# Patient Record
Sex: MALE | Race: WHITE | NOT HISPANIC OR LATINO | Employment: FULL TIME | URBAN - METROPOLITAN AREA
[De-identification: names, ages, dates, MRNs, and addresses within clinical notes are randomized per-mention and may not be internally consistent; named-entity substitution may affect disease eponyms.]

---

## 2017-07-28 LAB
CHOLEST SERPL-MCNC: 235 MG/DL (ref 100–199)
HDLC SERPL-MCNC: 75 MG/DL
LDLC SERPL CALC-MCNC: 144 MG/DL (ref 0–99)
TRIGL SERPL-MCNC: 81 MG/DL (ref 0–149)
VLDLC SERPL CALC-MCNC: 16 MG/DL (ref 5–40)

## 2018-05-23 LAB — RAPID GROUP A STREP (OHS): NEGATIVE

## 2020-08-18 ENCOUNTER — HISTORICAL (OUTPATIENT)
Dept: LAB | Facility: HOSPITAL | Age: 45
End: 2020-08-18

## 2021-04-13 ENCOUNTER — HISTORICAL (OUTPATIENT)
Dept: LAB | Facility: HOSPITAL | Age: 46
End: 2021-04-13

## 2021-08-04 ENCOUNTER — HISTORICAL (OUTPATIENT)
Dept: LAB | Facility: HOSPITAL | Age: 46
End: 2021-08-04

## 2021-10-25 ENCOUNTER — HISTORICAL (OUTPATIENT)
Dept: LAB | Facility: HOSPITAL | Age: 46
End: 2021-10-25

## 2021-11-30 ENCOUNTER — HISTORICAL (OUTPATIENT)
Dept: LAB | Facility: HOSPITAL | Age: 46
End: 2021-11-30

## 2022-04-07 ENCOUNTER — HISTORICAL (OUTPATIENT)
Dept: ADMINISTRATIVE | Facility: HOSPITAL | Age: 47
End: 2022-04-07

## 2022-04-24 VITALS
DIASTOLIC BLOOD PRESSURE: 80 MMHG | OXYGEN SATURATION: 99 % | SYSTOLIC BLOOD PRESSURE: 137 MMHG | WEIGHT: 224.63 LBS | BODY MASS INDEX: 28.83 KG/M2 | HEIGHT: 74 IN

## 2022-06-30 ENCOUNTER — OFFICE VISIT (OUTPATIENT)
Dept: FAMILY MEDICINE | Facility: CLINIC | Age: 47
End: 2022-06-30
Payer: COMMERCIAL

## 2022-06-30 VITALS
BODY MASS INDEX: 27.59 KG/M2 | DIASTOLIC BLOOD PRESSURE: 86 MMHG | TEMPERATURE: 100 F | OXYGEN SATURATION: 97 % | HEIGHT: 74 IN | WEIGHT: 215 LBS | RESPIRATION RATE: 16 BRPM | HEART RATE: 73 BPM | SYSTOLIC BLOOD PRESSURE: 120 MMHG

## 2022-06-30 DIAGNOSIS — J06.9 UPPER RESPIRATORY TRACT INFECTION, UNSPECIFIED TYPE: Primary | ICD-10-CM

## 2022-06-30 DIAGNOSIS — J02.9 SORE THROAT: ICD-10-CM

## 2022-06-30 DIAGNOSIS — R09.82 POST-NASAL DRIP: ICD-10-CM

## 2022-06-30 PROBLEM — E55.9 VITAMIN D DEFICIENCY: Status: ACTIVE | Noted: 2022-06-30

## 2022-06-30 PROBLEM — E53.8 COBALAMIN DEFICIENCY: Status: ACTIVE | Noted: 2022-06-30

## 2022-06-30 PROBLEM — F41.1 GENERALIZED ANXIETY DISORDER: Status: ACTIVE | Noted: 2022-06-30

## 2022-06-30 PROBLEM — E78.5 HYPERLIPIDEMIA: Status: ACTIVE | Noted: 2022-06-30

## 2022-06-30 LAB
CTP QC/QA: YES
CTP QC/QA: YES
FLUAV AG NPH QL: NEGATIVE
FLUBV AG NPH QL: NEGATIVE
S PYO RRNA THROAT QL PROBE: NEGATIVE

## 2022-06-30 PROCEDURE — 1160F RVW MEDS BY RX/DR IN RCRD: CPT | Mod: CPTII,,, | Performed by: NURSE PRACTITIONER

## 2022-06-30 PROCEDURE — 87804 POCT INFLUENZA A/B: ICD-10-PCS | Mod: QW,,, | Performed by: NURSE PRACTITIONER

## 2022-06-30 PROCEDURE — 96372 THER/PROPH/DIAG INJ SC/IM: CPT | Mod: ,,, | Performed by: NURSE PRACTITIONER

## 2022-06-30 PROCEDURE — 87880 POCT RAPID STREP A: ICD-10-PCS | Mod: QW,,, | Performed by: NURSE PRACTITIONER

## 2022-06-30 PROCEDURE — 87880 STREP A ASSAY W/OPTIC: CPT | Mod: QW,,, | Performed by: NURSE PRACTITIONER

## 2022-06-30 PROCEDURE — 3079F PR MOST RECENT DIASTOLIC BLOOD PRESSURE 80-89 MM HG: ICD-10-PCS | Mod: CPTII,,, | Performed by: NURSE PRACTITIONER

## 2022-06-30 PROCEDURE — 1160F PR REVIEW ALL MEDS BY PRESCRIBER/CLIN PHARMACIST DOCUMENTED: ICD-10-PCS | Mod: CPTII,,, | Performed by: NURSE PRACTITIONER

## 2022-06-30 PROCEDURE — 99213 PR OFFICE/OUTPT VISIT, EST, LEVL III, 20-29 MIN: ICD-10-PCS | Mod: 25,,, | Performed by: NURSE PRACTITIONER

## 2022-06-30 PROCEDURE — 96372 PR INJECTION,THERAP/PROPH/DIAG2ST, IM OR SUBCUT: ICD-10-PCS | Mod: ,,, | Performed by: NURSE PRACTITIONER

## 2022-06-30 PROCEDURE — 99213 OFFICE O/P EST LOW 20 MIN: CPT | Mod: 25,,, | Performed by: NURSE PRACTITIONER

## 2022-06-30 PROCEDURE — 3079F DIAST BP 80-89 MM HG: CPT | Mod: CPTII,,, | Performed by: NURSE PRACTITIONER

## 2022-06-30 PROCEDURE — 3008F PR BODY MASS INDEX (BMI) DOCUMENTED: ICD-10-PCS | Mod: CPTII,,, | Performed by: NURSE PRACTITIONER

## 2022-06-30 PROCEDURE — 87804 INFLUENZA ASSAY W/OPTIC: CPT | Mod: QW,,, | Performed by: NURSE PRACTITIONER

## 2022-06-30 PROCEDURE — 3074F SYST BP LT 130 MM HG: CPT | Mod: CPTII,,, | Performed by: NURSE PRACTITIONER

## 2022-06-30 PROCEDURE — 1159F MED LIST DOCD IN RCRD: CPT | Mod: CPTII,,, | Performed by: NURSE PRACTITIONER

## 2022-06-30 PROCEDURE — 1159F PR MEDICATION LIST DOCUMENTED IN MEDICAL RECORD: ICD-10-PCS | Mod: CPTII,,, | Performed by: NURSE PRACTITIONER

## 2022-06-30 PROCEDURE — 3008F BODY MASS INDEX DOCD: CPT | Mod: CPTII,,, | Performed by: NURSE PRACTITIONER

## 2022-06-30 PROCEDURE — 3074F PR MOST RECENT SYSTOLIC BLOOD PRESSURE < 130 MM HG: ICD-10-PCS | Mod: CPTII,,, | Performed by: NURSE PRACTITIONER

## 2022-06-30 RX ORDER — DEXAMETHASONE SODIUM PHOSPHATE 100 MG/10ML
10 INJECTION INTRAMUSCULAR; INTRAVENOUS
Status: COMPLETED | OUTPATIENT
Start: 2022-06-30 | End: 2022-06-30

## 2022-06-30 RX ORDER — ALPRAZOLAM 0.25 MG/1
0.25 TABLET ORAL 3 TIMES DAILY PRN
COMMUNITY
Start: 2022-02-02 | End: 2023-11-02 | Stop reason: SDUPTHER

## 2022-06-30 RX ORDER — ROSUVASTATIN CALCIUM 20 MG/1
20 TABLET, COATED ORAL NIGHTLY
COMMUNITY
Start: 2022-04-13 | End: 2022-10-11

## 2022-06-30 RX ADMIN — DEXAMETHASONE SODIUM PHOSPHATE 10 MG: 100 INJECTION INTRAMUSCULAR; INTRAVENOUS at 10:06

## 2022-06-30 NOTE — PROGRESS NOTES
Subjective:       Patient ID: Iron Yusuf Jr. is a 46 y.o. male.    Chief Complaint: Sore Throat (X2 days), post nasal drip (X2 days), and Fatigue      HPI     This is a 46-year-old white male who presents to clinic today with complaint sore throat, postnasal drip, fatigue, nasal congestion that started 2 days ago.  Denies any sick contacts.  Denies any fever.      Review of Systems   Constitutional: Negative.    HENT: Positive for nasal congestion, postnasal drip and sore throat.    Eyes: Negative.    Respiratory: Negative.    Cardiovascular: Negative.    Gastrointestinal: Negative.    Musculoskeletal: Negative.    Integumentary:  Negative.   Allergic/Immunologic: Negative.    Neurological: Negative.    Hematological: Negative.    Psychiatric/Behavioral: Negative.    All other systems reviewed and are negative.          The patient's Health Maintenance was reviewed and the following appears to be due:   Health Maintenance Due   Topic Date Due    Hepatitis C Screening  Never done    COVID-19 Vaccine (1) Never done    HIV Screening  Never done    TETANUS VACCINE  Never done    Colorectal Cancer Screening  Never done       Past Medical History:  Past Medical History:   Diagnosis Date    Anxiety disorder, unspecified     Cobalamin deficiency     Hyperlipidemia     Indigestion     Vitamin D deficiency      History reviewed. No pertinent surgical history.  Review of patient's allergies indicates:  No Known Allergies  Current Outpatient Medications on File Prior to Visit   Medication Sig Dispense Refill    ALPRAZolam (XANAX) 0.25 MG tablet Take 0.25 mg by mouth 3 (three) times daily as needed.      rosuvastatin (CRESTOR) 20 MG tablet Take 20 mg by mouth nightly.       No current facility-administered medications on file prior to visit.     Social History     Socioeconomic History    Marital status:    Tobacco Use    Smoking status: Never Smoker    Smokeless tobacco: Never Used   Substance and Sexual  "Activity    Alcohol use: Yes    Drug use: Never     Family History   Problem Relation Age of Onset    Rheum arthritis Mother          Objective:       /86 (BP Location: Left arm)   Pulse 73   Temp 99.8 °F (37.7 °C) (Oral)   Resp 16   Ht 6' 2" (1.88 m)   Wt 97.5 kg (215 lb)   SpO2 97%   BMI 27.60 kg/m²      Physical Exam  Vitals and nursing note reviewed.   Constitutional:       Appearance: Normal appearance. He is normal weight.   HENT:      Head: Normocephalic and atraumatic.      Right Ear: Tympanic membrane, ear canal and external ear normal.      Left Ear: Tympanic membrane, ear canal and external ear normal.      Nose: Rhinorrhea present.      Mouth/Throat:      Mouth: Mucous membranes are moist.      Pharynx: Oropharynx is clear. Posterior oropharyngeal erythema present.   Eyes:      Extraocular Movements: Extraocular movements intact.      Conjunctiva/sclera: Conjunctivae normal.      Pupils: Pupils are equal, round, and reactive to light.   Cardiovascular:      Rate and Rhythm: Normal rate and regular rhythm.      Pulses: Normal pulses.      Heart sounds: Normal heart sounds.   Pulmonary:      Effort: Pulmonary effort is normal.      Breath sounds: Normal breath sounds.   Musculoskeletal:         General: Normal range of motion.      Cervical back: Normal range of motion and neck supple.   Skin:     General: Skin is warm and dry.   Neurological:      General: No focal deficit present.      Mental Status: He is alert and oriented to person, place, and time.   Psychiatric:         Mood and Affect: Mood normal.         Behavior: Behavior normal.         Thought Content: Thought content normal.         Judgment: Judgment normal.         Labs  Office Visit on 06/30/2022   Component Date Value Ref Range Status    Rapid Strep A Screen 06/30/2022 Negative  Negative Final-Edited     Acceptable 06/30/2022 Yes   Final-Edited             Assessment:       Problem List Items Addressed " This Visit    None     Visit Diagnoses     Upper respiratory tract infection, unspecified type    -  Primary    Relevant Medications    dexamethasone injection 10 mg (Start on 6/30/2022 10:00 AM)    Sore throat        Relevant Orders    POCT Rapid Strep A (Completed)    POCT Influenza A/B    Post-nasal drip              Plan:         1. Upper respiratory tract infection, unspecified type  Instructed to take home cover testing quarantine if positive.  Continue over-the-counter [place] for symptoms.  - dexamethasone injection 10 mg    2. Sore throat  Strep negative  - POCT Rapid Strep A  - POCT Influenza A/B    3. Post-nasal drip  Flu negative

## 2022-09-12 ENCOUNTER — OFFICE VISIT (OUTPATIENT)
Dept: FAMILY MEDICINE | Facility: CLINIC | Age: 47
End: 2022-09-12
Payer: COMMERCIAL

## 2022-09-12 VITALS
RESPIRATION RATE: 16 BRPM | DIASTOLIC BLOOD PRESSURE: 82 MMHG | SYSTOLIC BLOOD PRESSURE: 126 MMHG | WEIGHT: 218 LBS | TEMPERATURE: 99 F | HEIGHT: 74 IN | OXYGEN SATURATION: 99 % | BODY MASS INDEX: 27.98 KG/M2 | HEART RATE: 50 BPM

## 2022-09-12 DIAGNOSIS — T78.40XA ALLERGIC REACTION, INITIAL ENCOUNTER: ICD-10-CM

## 2022-09-12 DIAGNOSIS — T63.461A WASP STING, ACCIDENTAL OR UNINTENTIONAL, INITIAL ENCOUNTER: Primary | ICD-10-CM

## 2022-09-12 PROCEDURE — 99212 PR OFFICE/OUTPT VISIT, EST, LEVL II, 10-19 MIN: ICD-10-PCS | Mod: 25,,, | Performed by: NURSE PRACTITIONER

## 2022-09-12 PROCEDURE — 96372 THER/PROPH/DIAG INJ SC/IM: CPT | Mod: ,,, | Performed by: NURSE PRACTITIONER

## 2022-09-12 PROCEDURE — 1160F RVW MEDS BY RX/DR IN RCRD: CPT | Mod: CPTII,,, | Performed by: NURSE PRACTITIONER

## 2022-09-12 PROCEDURE — 1159F MED LIST DOCD IN RCRD: CPT | Mod: CPTII,,, | Performed by: NURSE PRACTITIONER

## 2022-09-12 PROCEDURE — 1160F PR REVIEW ALL MEDS BY PRESCRIBER/CLIN PHARMACIST DOCUMENTED: ICD-10-PCS | Mod: CPTII,,, | Performed by: NURSE PRACTITIONER

## 2022-09-12 PROCEDURE — 1159F PR MEDICATION LIST DOCUMENTED IN MEDICAL RECORD: ICD-10-PCS | Mod: CPTII,,, | Performed by: NURSE PRACTITIONER

## 2022-09-12 PROCEDURE — 3074F SYST BP LT 130 MM HG: CPT | Mod: CPTII,,, | Performed by: NURSE PRACTITIONER

## 2022-09-12 PROCEDURE — 3079F PR MOST RECENT DIASTOLIC BLOOD PRESSURE 80-89 MM HG: ICD-10-PCS | Mod: CPTII,,, | Performed by: NURSE PRACTITIONER

## 2022-09-12 PROCEDURE — 99212 OFFICE O/P EST SF 10 MIN: CPT | Mod: 25,,, | Performed by: NURSE PRACTITIONER

## 2022-09-12 PROCEDURE — 96372 PR INJECTION,THERAP/PROPH/DIAG2ST, IM OR SUBCUT: ICD-10-PCS | Mod: ,,, | Performed by: NURSE PRACTITIONER

## 2022-09-12 PROCEDURE — 3008F BODY MASS INDEX DOCD: CPT | Mod: CPTII,,, | Performed by: NURSE PRACTITIONER

## 2022-09-12 PROCEDURE — 3074F PR MOST RECENT SYSTOLIC BLOOD PRESSURE < 130 MM HG: ICD-10-PCS | Mod: CPTII,,, | Performed by: NURSE PRACTITIONER

## 2022-09-12 PROCEDURE — 3079F DIAST BP 80-89 MM HG: CPT | Mod: CPTII,,, | Performed by: NURSE PRACTITIONER

## 2022-09-12 PROCEDURE — 3008F PR BODY MASS INDEX (BMI) DOCUMENTED: ICD-10-PCS | Mod: CPTII,,, | Performed by: NURSE PRACTITIONER

## 2022-09-12 RX ORDER — DEXAMETHASONE SODIUM PHOSPHATE 100 MG/10ML
10 INJECTION INTRAMUSCULAR; INTRAVENOUS
Status: COMPLETED | OUTPATIENT
Start: 2022-09-12 | End: 2022-09-12

## 2022-09-12 RX ADMIN — DEXAMETHASONE SODIUM PHOSPHATE 10 MG: 100 INJECTION INTRAMUSCULAR; INTRAVENOUS at 08:09

## 2022-09-12 NOTE — PROGRESS NOTES
Subjective:       Patient ID: Iron Yusuf Jr. is a 46 y.o. male.    Chief Complaint: Insect Bite (Wasp sting to L wrist. Swelling spreading up arm. Redness to area. Warm to touch.)      HPI   This is a 46-year-old white male who presents to clinic today with complaint of left wrist swelling, redness, itching.  States was outside working yesterday and got stung by wasp at around 11:00 a.m..  States throughout the afternoon the swelling and redness got worse.  Denies any shortness of breath, scratchy throat.  Complains of redness, swelling, itching, mild pain and warmth to the site and spreading up his arm.    Review of Systems  Comprehensive review of systems negative except as stated in HPI    The patient's Health Maintenance was reviewed and the following appears to be due:   Health Maintenance Due   Topic Date Due    Hepatitis C Screening  Never done    COVID-19 Vaccine (1) Never done    HIV Screening  Never done    TETANUS VACCINE  Never done    Colorectal Cancer Screening  Never done    Influenza Vaccine (1) Never done       Past Medical History:  Past Medical History:   Diagnosis Date    Anxiety disorder, unspecified     Cobalamin deficiency     Hyperlipidemia     Indigestion     Vitamin D deficiency      History reviewed. No pertinent surgical history.  Review of patient's allergies indicates:  No Known Allergies  Current Outpatient Medications on File Prior to Visit   Medication Sig Dispense Refill    ALPRAZolam (XANAX) 0.25 MG tablet Take 0.25 mg by mouth 3 (three) times daily as needed.      rosuvastatin (CRESTOR) 20 MG tablet Take 20 mg by mouth nightly.       No current facility-administered medications on file prior to visit.     Social History     Socioeconomic History    Marital status:    Tobacco Use    Smoking status: Never    Smokeless tobacco: Never   Substance and Sexual Activity    Alcohol use: Yes    Drug use: Never     Family History   Problem Relation Age of Onset    Rheum arthritis  "Mother        Objective:       /82 (BP Location: Left arm)   Pulse (!) 50   Temp 98.5 °F (36.9 °C) (Oral)   Resp 16   Ht 6' 2" (1.88 m)   Wt 98.9 kg (218 lb)   SpO2 99%   BMI 27.99 kg/m²      Physical Exam  Vitals and nursing note reviewed.   Constitutional:       Appearance: Normal appearance. He is normal weight.   HENT:      Head: Normocephalic and atraumatic.      Right Ear: Tympanic membrane, ear canal and external ear normal.      Left Ear: Tympanic membrane, ear canal and external ear normal.      Nose: Nose normal.      Mouth/Throat:      Mouth: Mucous membranes are moist.      Pharynx: Oropharynx is clear.   Eyes:      Extraocular Movements: Extraocular movements intact.      Conjunctiva/sclera: Conjunctivae normal.      Pupils: Pupils are equal, round, and reactive to light.   Cardiovascular:      Rate and Rhythm: Regular rhythm. Bradycardia present.      Pulses: Normal pulses.      Heart sounds: Normal heart sounds.   Pulmonary:      Effort: Pulmonary effort is normal.      Breath sounds: Normal breath sounds.   Musculoskeletal:         General: Normal range of motion.      Cervical back: Normal range of motion and neck supple.   Skin:     General: Skin is warm and dry.      Findings: Erythema present.      Comments: Left lateral wrist with wasp sting. Surrounding erythema and swelling extending up back of forearm midway to elbow.    Neurological:      General: No focal deficit present.      Mental Status: He is alert and oriented to person, place, and time.   Psychiatric:         Mood and Affect: Mood normal.         Behavior: Behavior normal.         Thought Content: Thought content normal.         Judgment: Judgment normal.       Labs  Office Visit on 06/30/2022   Component Date Value Ref Range Status    Rapid Strep A Screen 06/30/2022 Negative  Negative Final-Edited     Acceptable 06/30/2022 Yes   Final-Edited    Rapid Influenza A Ag 06/30/2022 Negative  Negative Final    " Rapid Influenza B Ag 06/30/2022 Negative  Negative Final     Acceptable 06/30/2022 Yes   Final       Assessment and Plan     1. Wasp sting, accidental or unintentional, initial encounter  -     dexamethasone injection 10 mg    2. Allergic reaction, initial encounter  -     dexamethasone injection 10 mg       Instructed to tale OTC Benadryl as needed and RTC if worsens.     Follow up if symptoms worsen or fail to improve.

## 2022-09-15 ENCOUNTER — HISTORICAL (OUTPATIENT)
Dept: ADMINISTRATIVE | Facility: HOSPITAL | Age: 47
End: 2022-09-15
Payer: COMMERCIAL

## 2022-10-25 ENCOUNTER — TELEPHONE (OUTPATIENT)
Dept: FAMILY MEDICINE | Facility: CLINIC | Age: 47
End: 2022-10-25
Payer: COMMERCIAL

## 2022-10-25 DIAGNOSIS — Z11.59 NEED FOR HEPATITIS C SCREENING TEST: ICD-10-CM

## 2022-10-25 DIAGNOSIS — Z00.00 ANNUAL PHYSICAL EXAM: Primary | ICD-10-CM

## 2022-10-25 DIAGNOSIS — Z12.5 PROSTATE CANCER SCREENING: ICD-10-CM

## 2022-10-25 DIAGNOSIS — E55.9 VITAMIN D DEFICIENCY: ICD-10-CM

## 2022-10-25 DIAGNOSIS — Z11.4 SCREENING FOR HIV (HUMAN IMMUNODEFICIENCY VIRUS): ICD-10-CM

## 2022-10-25 DIAGNOSIS — E53.8 COBALAMIN DEFICIENCY: ICD-10-CM

## 2022-10-25 NOTE — TELEPHONE ENCOUNTER
Are there any outstanding tasks in patient's chart?  labs    2. Do we have outstanding/pending referrals?  n    3. Has the patient been seen in an ER, Urgent Care, or admitted since last visit?  n    4. Has patient seen any other health care providers since last visit?  n    5.  Has patient had any blood work or x-rays done since last visit?     Patient will complete labs at [facility]      Please order labs wellness labs at [facility]

## 2022-10-28 ENCOUNTER — TELEPHONE (OUTPATIENT)
Dept: FAMILY MEDICINE | Facility: CLINIC | Age: 47
End: 2022-10-28
Payer: COMMERCIAL

## 2022-10-28 ENCOUNTER — LAB VISIT (OUTPATIENT)
Dept: LAB | Facility: HOSPITAL | Age: 47
End: 2022-10-28
Attending: NURSE PRACTITIONER
Payer: COMMERCIAL

## 2022-10-28 DIAGNOSIS — Z11.59 NEED FOR HEPATITIS C SCREENING TEST: ICD-10-CM

## 2022-10-28 DIAGNOSIS — Z11.4 SCREENING FOR HIV (HUMAN IMMUNODEFICIENCY VIRUS): ICD-10-CM

## 2022-10-28 DIAGNOSIS — R73.9 HYPERGLYCEMIA: Primary | ICD-10-CM

## 2022-10-28 DIAGNOSIS — E53.8 COBALAMIN DEFICIENCY: ICD-10-CM

## 2022-10-28 DIAGNOSIS — Z12.5 PROSTATE CANCER SCREENING: ICD-10-CM

## 2022-10-28 DIAGNOSIS — E55.9 VITAMIN D DEFICIENCY: ICD-10-CM

## 2022-10-28 DIAGNOSIS — Z00.00 ANNUAL PHYSICAL EXAM: ICD-10-CM

## 2022-10-28 LAB
ALBUMIN SERPL-MCNC: 4.2 GM/DL (ref 3.5–5)
ALBUMIN/GLOB SERPL: 1.2 RATIO (ref 1.1–2)
ALP SERPL-CCNC: 79 UNIT/L (ref 40–150)
ALT SERPL-CCNC: 68 UNIT/L (ref 0–55)
AST SERPL-CCNC: 48 UNIT/L (ref 5–34)
BASOPHILS # BLD AUTO: 0.03 X10(3)/MCL (ref 0–0.2)
BASOPHILS NFR BLD AUTO: 0.6 %
BILIRUBIN DIRECT+TOT PNL SERPL-MCNC: 0.5 MG/DL
BUN SERPL-MCNC: 15.3 MG/DL (ref 8.9–20.6)
CALCIUM SERPL-MCNC: 9.5 MG/DL (ref 8.4–10.2)
CHLORIDE SERPL-SCNC: 105 MMOL/L (ref 98–107)
CHOLEST SERPL-MCNC: 186 MG/DL
CHOLEST/HDLC SERPL: 2 {RATIO} (ref 0–5)
CO2 SERPL-SCNC: 26 MMOL/L (ref 22–29)
CREAT SERPL-MCNC: 1.1 MG/DL (ref 0.73–1.18)
DEPRECATED CALCIDIOL+CALCIFEROL SERPL-MC: 53.4 NG/ML (ref 30–80)
EOSINOPHIL # BLD AUTO: 0.14 X10(3)/MCL (ref 0–0.9)
EOSINOPHIL NFR BLD AUTO: 2.8 %
ERYTHROCYTE [DISTWIDTH] IN BLOOD BY AUTOMATED COUNT: 12.9 % (ref 11.5–17)
EST. AVERAGE GLUCOSE BLD GHB EST-MCNC: 111.2 MG/DL
GFR SERPLBLD CREATININE-BSD FMLA CKD-EPI: >60 MLS/MIN/1.73/M2
GLOBULIN SER-MCNC: 3.5 GM/DL (ref 2.4–3.5)
GLUCOSE SERPL-MCNC: 107 MG/DL (ref 74–100)
HBA1C MFR BLD: 5.5 %
HCT VFR BLD AUTO: 47.7 % (ref 42–52)
HCV AB SERPL QL IA: NONREACTIVE
HDLC SERPL-MCNC: 78 MG/DL (ref 35–60)
HGB BLD-MCNC: 15.6 GM/DL (ref 14–18)
HIV 1+2 AB+HIV1 P24 AG SERPL QL IA: NONREACTIVE
IMM GRANULOCYTES # BLD AUTO: 0.02 X10(3)/MCL (ref 0–0.04)
IMM GRANULOCYTES NFR BLD AUTO: 0.4 %
LDLC SERPL CALC-MCNC: 88 MG/DL (ref 50–140)
LYMPHOCYTES # BLD AUTO: 1.88 X10(3)/MCL (ref 0.6–4.6)
LYMPHOCYTES NFR BLD AUTO: 38.1 %
MCH RBC QN AUTO: 30.2 PG (ref 27–31)
MCHC RBC AUTO-ENTMCNC: 32.7 MG/DL (ref 33–36)
MCV RBC AUTO: 92.4 FL (ref 80–94)
MONOCYTES # BLD AUTO: 0.58 X10(3)/MCL (ref 0.1–1.3)
MONOCYTES NFR BLD AUTO: 11.8 %
NEUTROPHILS # BLD AUTO: 2.3 X10(3)/MCL (ref 2.1–9.2)
NEUTROPHILS NFR BLD AUTO: 46.3 %
PLATELET # BLD AUTO: 248 X10(3)/MCL (ref 130–400)
PMV BLD AUTO: 9.1 FL (ref 7.4–10.4)
POTASSIUM SERPL-SCNC: 4.5 MMOL/L (ref 3.5–5.1)
PROT SERPL-MCNC: 7.7 GM/DL (ref 6.4–8.3)
PSA SERPL-MCNC: 0.7 NG/ML
RBC # BLD AUTO: 5.16 X10(6)/MCL (ref 4.7–6.1)
SODIUM SERPL-SCNC: 143 MMOL/L (ref 136–145)
TRIGL SERPL-MCNC: 100 MG/DL (ref 34–140)
TSH SERPL-ACNC: 1.43 UIU/ML (ref 0.35–4.94)
VIT B12 SERPL-MCNC: 644 PG/ML (ref 213–816)
VLDLC SERPL CALC-MCNC: 20 MG/DL
WBC # SPEC AUTO: 4.9 X10(3)/MCL (ref 4.5–11.5)

## 2022-10-28 PROCEDURE — 82607 VITAMIN B-12: CPT

## 2022-10-28 PROCEDURE — 87389 HIV-1 AG W/HIV-1&-2 AB AG IA: CPT

## 2022-10-28 PROCEDURE — 36415 COLL VENOUS BLD VENIPUNCTURE: CPT

## 2022-10-28 PROCEDURE — 86803 HEPATITIS C AB TEST: CPT

## 2022-10-28 PROCEDURE — 85025 COMPLETE CBC W/AUTO DIFF WBC: CPT

## 2022-10-28 PROCEDURE — 80053 COMPREHEN METABOLIC PANEL: CPT

## 2022-10-28 PROCEDURE — 80061 LIPID PANEL: CPT

## 2022-10-28 PROCEDURE — 84153 ASSAY OF PSA TOTAL: CPT

## 2022-10-28 PROCEDURE — 82306 VITAMIN D 25 HYDROXY: CPT

## 2022-10-28 PROCEDURE — 83036 HEMOGLOBIN GLYCOSYLATED A1C: CPT

## 2022-10-28 PROCEDURE — 84443 ASSAY THYROID STIM HORMONE: CPT

## 2022-10-28 NOTE — TELEPHONE ENCOUNTER
----- Message from JUANJO Smith sent at 10/28/2022  8:25 AM CDT -----  Glucose elevated at 107 nonfasting labs, hemoglobin A1c added.  Patient nubia this morning, please notify [facility] of add on lab.

## 2022-10-28 NOTE — PROGRESS NOTES
Glucose elevated at 107 nonfasting labs, hemoglobin A1c added.  Patient nubia this morning, please notify [facility] of add on lab.

## 2022-11-01 ENCOUNTER — OFFICE VISIT (OUTPATIENT)
Dept: FAMILY MEDICINE | Facility: CLINIC | Age: 47
End: 2022-11-01
Payer: COMMERCIAL

## 2022-11-01 VITALS
SYSTOLIC BLOOD PRESSURE: 128 MMHG | TEMPERATURE: 98 F | HEIGHT: 74 IN | DIASTOLIC BLOOD PRESSURE: 74 MMHG | WEIGHT: 216 LBS | RESPIRATION RATE: 16 BRPM | HEART RATE: 56 BPM | BODY MASS INDEX: 27.72 KG/M2 | OXYGEN SATURATION: 98 %

## 2022-11-01 DIAGNOSIS — G47.33 OSA (OBSTRUCTIVE SLEEP APNEA): ICD-10-CM

## 2022-11-01 DIAGNOSIS — E78.5 HYPERLIPIDEMIA, UNSPECIFIED HYPERLIPIDEMIA TYPE: ICD-10-CM

## 2022-11-01 DIAGNOSIS — Z12.5 PROSTATE CANCER SCREENING: ICD-10-CM

## 2022-11-01 DIAGNOSIS — E55.9 VITAMIN D DEFICIENCY: ICD-10-CM

## 2022-11-01 DIAGNOSIS — Z12.11 COLON CANCER SCREENING: ICD-10-CM

## 2022-11-01 DIAGNOSIS — Z11.59 NEED FOR HEPATITIS C SCREENING TEST: ICD-10-CM

## 2022-11-01 DIAGNOSIS — E53.8 COBALAMIN DEFICIENCY: ICD-10-CM

## 2022-11-01 DIAGNOSIS — Z00.00 ANNUAL PHYSICAL EXAM: Primary | ICD-10-CM

## 2022-11-01 DIAGNOSIS — Z11.4 SCREENING FOR HIV (HUMAN IMMUNODEFICIENCY VIRUS): ICD-10-CM

## 2022-11-01 PROCEDURE — 1160F RVW MEDS BY RX/DR IN RCRD: CPT | Mod: CPTII,,, | Performed by: NURSE PRACTITIONER

## 2022-11-01 PROCEDURE — 3074F PR MOST RECENT SYSTOLIC BLOOD PRESSURE < 130 MM HG: ICD-10-PCS | Mod: CPTII,,, | Performed by: NURSE PRACTITIONER

## 2022-11-01 PROCEDURE — 1159F MED LIST DOCD IN RCRD: CPT | Mod: CPTII,,, | Performed by: NURSE PRACTITIONER

## 2022-11-01 PROCEDURE — 3008F BODY MASS INDEX DOCD: CPT | Mod: CPTII,,, | Performed by: NURSE PRACTITIONER

## 2022-11-01 PROCEDURE — 3078F DIAST BP <80 MM HG: CPT | Mod: CPTII,,, | Performed by: NURSE PRACTITIONER

## 2022-11-01 PROCEDURE — 1159F PR MEDICATION LIST DOCUMENTED IN MEDICAL RECORD: ICD-10-PCS | Mod: CPTII,,, | Performed by: NURSE PRACTITIONER

## 2022-11-01 PROCEDURE — 99396 PREV VISIT EST AGE 40-64: CPT | Mod: ,,, | Performed by: NURSE PRACTITIONER

## 2022-11-01 PROCEDURE — 1160F PR REVIEW ALL MEDS BY PRESCRIBER/CLIN PHARMACIST DOCUMENTED: ICD-10-PCS | Mod: CPTII,,, | Performed by: NURSE PRACTITIONER

## 2022-11-01 PROCEDURE — 3078F PR MOST RECENT DIASTOLIC BLOOD PRESSURE < 80 MM HG: ICD-10-PCS | Mod: CPTII,,, | Performed by: NURSE PRACTITIONER

## 2022-11-01 PROCEDURE — 3008F PR BODY MASS INDEX (BMI) DOCUMENTED: ICD-10-PCS | Mod: CPTII,,, | Performed by: NURSE PRACTITIONER

## 2022-11-01 PROCEDURE — 99396 PR PREVENTIVE VISIT,EST,40-64: ICD-10-PCS | Mod: ,,, | Performed by: NURSE PRACTITIONER

## 2022-11-01 PROCEDURE — 3074F SYST BP LT 130 MM HG: CPT | Mod: CPTII,,, | Performed by: NURSE PRACTITIONER

## 2022-11-01 RX ORDER — AMPICILLIN TRIHYDRATE 250 MG
CAPSULE ORAL
COMMUNITY

## 2022-11-01 RX ORDER — MULTIVITAMIN
1 TABLET ORAL DAILY
COMMUNITY

## 2022-11-01 NOTE — ASSESSMENT & PLAN NOTE
Discussed colon cancer screening options with patient including colonoscopy and Cologuard.  Patient would like to think about it and speak with his wife and he will call me and let me know what his decision is.

## 2022-11-01 NOTE — PROGRESS NOTES
Subjective:       Patient ID: Iron Yusuf Jr. is a 46 y.o. male.    Chief Complaint: Annual Exam      HPI   This is a 46-year-old white male who presents to clinic today for an annual wellness exam.  Patient has past medical history of vitamin-D deficiency, B12 deficiency, hyperlipidemia, anxiety, sleep apnea.  States doing well with his medication and denies any side effects.  No complaints today.    Review of Systems  Comprehensive review of systems negative except as stated in HPI    The patient's Health Maintenance was reviewed and the following appears to be due:   Health Maintenance Due   Topic Date Due    TETANUS VACCINE  Never done    Colorectal Cancer Screening  Never done       Past Medical History:  Past Medical History:   Diagnosis Date    Anxiety disorder, unspecified     Cobalamin deficiency     Hyperlipidemia     Indigestion     Vitamin D deficiency      History reviewed. No pertinent surgical history.  Review of patient's allergies indicates:  No Known Allergies  Current Outpatient Medications on File Prior to Visit   Medication Sig Dispense Refill    ALPRAZolam (XANAX) 0.25 MG tablet Take 0.25 mg by mouth 3 (three) times daily as needed.      multivitamin (THERAGRAN) per tablet Take 1 tablet by mouth once daily.      red yeast rice 600 mg Cap Take by mouth.      rosuvastatin (CRESTOR) 20 MG tablet TAKE 1 TABLET BY MOUTH EVERYDAY AT BEDTIME 90 tablet 1     No current facility-administered medications on file prior to visit.     Social History     Socioeconomic History    Marital status:    Tobacco Use    Smoking status: Never     Passive exposure: Never    Smokeless tobacco: Never   Substance and Sexual Activity    Alcohol use: Yes     Comment: socially    Drug use: Never    Sexual activity: Yes     Family History   Problem Relation Age of Onset    Rheum arthritis Mother        Objective:       /74 (BP Location: Left arm)   Pulse (!) 56   Temp 98.4 °F (36.9 °C) (Oral)   Resp 16   Ht  "6' 2" (1.88 m)   Wt 98 kg (216 lb)   SpO2 98%   BMI 27.73 kg/m²      Physical Exam  Vitals and nursing note reviewed.   Constitutional:       Appearance: Normal appearance. He is normal weight.   HENT:      Head: Normocephalic and atraumatic.      Right Ear: Tympanic membrane, ear canal and external ear normal.      Left Ear: Tympanic membrane, ear canal and external ear normal.      Nose: Nose normal.      Mouth/Throat:      Mouth: Mucous membranes are moist.      Pharynx: Oropharynx is clear.   Eyes:      Extraocular Movements: Extraocular movements intact.      Conjunctiva/sclera: Conjunctivae normal.      Pupils: Pupils are equal, round, and reactive to light.   Cardiovascular:      Rate and Rhythm: Normal rate and regular rhythm.      Pulses: Normal pulses.      Heart sounds: Normal heart sounds.   Pulmonary:      Effort: Pulmonary effort is normal.      Breath sounds: Normal breath sounds.   Abdominal:      General: Abdomen is flat. Bowel sounds are normal.      Palpations: Abdomen is soft.   Musculoskeletal:         General: Normal range of motion.      Cervical back: Normal range of motion and neck supple.   Skin:     General: Skin is warm and dry.   Neurological:      General: No focal deficit present.      Mental Status: He is alert and oriented to person, place, and time.   Psychiatric:         Mood and Affect: Mood normal.         Behavior: Behavior normal.         Thought Content: Thought content normal.         Judgment: Judgment normal.       Labs  Lab Visit on 10/28/2022   Component Date Value Ref Range Status    Sodium Level 10/28/2022 143  136 - 145 mmol/L Final    Potassium Level 10/28/2022 4.5  3.5 - 5.1 mmol/L Final    Chloride 10/28/2022 105  98 - 107 mmol/L Final    Carbon Dioxide 10/28/2022 26  22 - 29 mmol/L Final    Glucose Level 10/28/2022 107 (H)  74 - 100 mg/dL Final    Blood Urea Nitrogen 10/28/2022 15.3  8.9 - 20.6 mg/dL Final    Creatinine 10/28/2022 1.10  0.73 - 1.18 mg/dL Final "    Calcium Level Total 10/28/2022 9.5  8.4 - 10.2 mg/dL Final    Protein Total 10/28/2022 7.7  6.4 - 8.3 gm/dL Final    Albumin Level 10/28/2022 4.2  3.5 - 5.0 gm/dL Final    Globulin 10/28/2022 3.5  2.4 - 3.5 gm/dL Final    Albumin/Globulin Ratio 10/28/2022 1.2  1.1 - 2.0 ratio Final    Bilirubin Total 10/28/2022 0.5  <=1.5 mg/dL Final    Alkaline Phosphatase 10/28/2022 79  40 - 150 unit/L Final    Alanine Aminotransferase 10/28/2022 68 (H)  0 - 55 unit/L Final    Aspartate Aminotransferase 10/28/2022 48 (H)  5 - 34 unit/L Final    eGFR 10/28/2022 >60  mls/min/1.73/m2 Final    Cholesterol Total 10/28/2022 186  <=200 mg/dL Final    HDL Cholesterol 10/28/2022 78 (H)  35 - 60 mg/dL Final    Triglyceride 10/28/2022 100  34 - 140 mg/dL Final    Cholesterol/HDL Ratio 10/28/2022 2  0 - 5 Final    Very Low Density Lipoprotein 10/28/2022 20   Final    LDL Cholesterol 10/28/2022 88.00  50.00 - 140.00 mg/dL Final    Thyroid Stimulating Hormone 10/28/2022 1.4260  0.3500 - 4.9400 uIU/mL Final    Prostate Specific Antigen 10/28/2022 0.70  <=4.00 ng/mL Final    Vit D 25 OH 10/28/2022 53.4  30.0 - 80.0 ng/mL Final    Hepatitis C Antibody 10/28/2022 Nonreactive  Nonreactive Final    HIV 10/28/2022 Nonreactive  Nonreactive Final    Vitamin B12 Level 10/28/2022 644  213 - 816 pg/mL Final    WBC 10/28/2022 4.9  4.5 - 11.5 x10(3)/mcL Final    RBC 10/28/2022 5.16  4.70 - 6.10 x10(6)/mcL Final    Hgb 10/28/2022 15.6  14.0 - 18.0 gm/dL Final    Hct 10/28/2022 47.7  42.0 - 52.0 % Final    MCV 10/28/2022 92.4  80.0 - 94.0 fL Final    MCH 10/28/2022 30.2  27.0 - 31.0 pg Final    MCHC 10/28/2022 32.7 (L)  33.0 - 36.0 mg/dL Final    RDW 10/28/2022 12.9  11.5 - 17.0 % Final    Platelet 10/28/2022 248  130 - 400 x10(3)/mcL Final    MPV 10/28/2022 9.1  7.4 - 10.4 fL Final    Neut % 10/28/2022 46.3  % Final    Lymph % 10/28/2022 38.1  % Final    Mono % 10/28/2022 11.8  % Final    Eos % 10/28/2022 2.8  % Final    Basophil % 10/28/2022 0.6  % Final     Lymph # 10/28/2022 1.88  0.6 - 4.6 x10(3)/mcL Final    Neut # 10/28/2022 2.3  2.1 - 9.2 x10(3)/mcL Final    Mono # 10/28/2022 0.58  0.1 - 1.3 x10(3)/mcL Final    Eos # 10/28/2022 0.14  0 - 0.9 x10(3)/mcL Final    Baso # 10/28/2022 0.03  0 - 0.2 x10(3)/mcL Final    IG# 10/28/2022 0.02  0 - 0.04 x10(3)/mcL Final    IG% 10/28/2022 0.4  % Final    Hemoglobin A1c 10/28/2022 5.5  <=7.0 % Final    Estimated Average Glucose 10/28/2022 111.2  mg/dL Final   Historical on 09/15/2022   Component Date Value Ref Range Status    Rapid Group A Strep 05/23/2018 Negative   Final   Historical on 09/15/2022   Component Date Value Ref Range Status    Cholesterol Total 07/28/2017 235 (H)  100 - 199 mg/dL Final    HDL Cholesterol 07/28/2017 75  >>39 mg/dL Final    LDL Cholesterol 07/28/2017 144 (H)  0 - 99 mg/dL Final    Triglyceride 07/28/2017 81  0 - 149 mg/dL Final    Very Low Density Lipoprotein 07/28/2017 16  5 - 40 mg/dL Final   Office Visit on 06/30/2022   Component Date Value Ref Range Status    Rapid Strep A Screen 06/30/2022 Negative  Negative Final-Edited     Acceptable 06/30/2022 Yes   Final-Edited    Rapid Influenza A Ag 06/30/2022 Negative  Negative Final    Rapid Influenza B Ag 06/30/2022 Negative  Negative Final     Acceptable 06/30/2022 Yes   Final       Assessment and Plan       ICD-10-CM ICD-9-CM   1. Annual physical exam  Z00.00 V70.0   2. Vitamin D deficiency  E55.9 268.9   3. Cobalamin deficiency  E53.8 266.2   4. Hyperlipidemia, unspecified hyperlipidemia type  E78.5 272.4   5. Colon cancer screening  Z12.11 V76.51   6. Prostate cancer screening  Z12.5 V76.44   7. SHAMIR (obstructive sleep apnea)  G47.33 327.23   8. Need for hepatitis C screening test  Z11.59 V73.89   9. Screening for HIV (human immunodeficiency virus)  Z11.4 V73.89        1. Annual physical exam  Overview:  Annual exam yearly in November 2. Vitamin D deficiency  Assessment & Plan:  Vitamin-D level 53.4, repeat 1  year      3. Cobalamin deficiency  Assessment & Plan:  B12 level 644, repeat 1 year      4. Hyperlipidemia, unspecified hyperlipidemia type  Overview:  April 2021 - start rosuvastatin 20 mg daily, normal LFTs  October 25, 2021 - AST 40, ALT 54, total cholesterol 187, HDL 79, triglycerides 164, LDL 75  10/28/2022 - ALT 68, AST 48, total cholesterol 186, LDL 88    Assessment & Plan:  Will repeat lipid panel in 6 months along with LFT    Orders:  -     Lipid Panel; Future; Expected date: 05/01/2023  -     Comprehensive Metabolic Panel; Future; Expected date: 05/01/2023    5. Colon cancer screening  Assessment & Plan:  Discussed colon cancer screening options with patient including colonoscopy and Cologuard.  Patient would like to think about it and speak with his wife and he will call me and let me know what his decision is.      6. Prostate cancer screening  Overview:  PSA yearly in November    Assessment & Plan:  PSA 0.70, repeat 1 year      7. SHAMIR (obstructive sleep apnea)  Overview:  Home sleep study with home Sleep delivered 11/19/2021  AHI 12.1  Declined Pap set up due to cost    Assessment & Plan:  Discussed with patient the risks of untreated sleep apnea.  Also let him know that once his sleep study is over 1 years old he will have to complete a new 1 if he ever wants to set up PAP therapy.  Patient verbalized understanding.      8. Need for hepatitis C screening test  Comments:  Nonreactive    9. Screening for HIV (human immunodeficiency virus)  Comments:  Nonreactive         Follow up in about 6 months (around 5/1/2023) for follow up.

## 2022-11-01 NOTE — ASSESSMENT & PLAN NOTE
Discussed with patient the risks of untreated sleep apnea.  Also let him know that once his sleep study is over 1 years old he will have to complete a new 1 if he ever wants to set up PAP therapy.  Patient verbalized understanding.

## 2023-01-30 ENCOUNTER — PATIENT MESSAGE (OUTPATIENT)
Dept: ADMINISTRATIVE | Facility: HOSPITAL | Age: 48
End: 2023-01-30
Payer: COMMERCIAL

## 2023-02-06 PROBLEM — Z00.00 ANNUAL PHYSICAL EXAM: Status: RESOLVED | Noted: 2022-11-01 | Resolved: 2023-02-06

## 2023-05-01 ENCOUNTER — PATIENT MESSAGE (OUTPATIENT)
Dept: ADMINISTRATIVE | Facility: HOSPITAL | Age: 48
End: 2023-05-01
Payer: COMMERCIAL

## 2023-05-31 ENCOUNTER — PATIENT MESSAGE (OUTPATIENT)
Dept: FAMILY MEDICINE | Facility: CLINIC | Age: 48
End: 2023-05-31
Payer: COMMERCIAL

## 2023-07-25 ENCOUNTER — PATIENT MESSAGE (OUTPATIENT)
Dept: ADMINISTRATIVE | Facility: HOSPITAL | Age: 48
End: 2023-07-25
Payer: COMMERCIAL

## 2023-10-05 DIAGNOSIS — E78.5 HYPERLIPIDEMIA, UNSPECIFIED HYPERLIPIDEMIA TYPE: ICD-10-CM

## 2023-10-05 RX ORDER — ROSUVASTATIN CALCIUM 20 MG/1
TABLET, COATED ORAL
Qty: 90 TABLET | Refills: 1 | Status: SHIPPED | OUTPATIENT
Start: 2023-10-05

## 2023-10-26 ENCOUNTER — TELEPHONE (OUTPATIENT)
Dept: FAMILY MEDICINE | Facility: CLINIC | Age: 48
End: 2023-10-26
Payer: COMMERCIAL

## 2023-10-26 DIAGNOSIS — E55.9 VITAMIN D DEFICIENCY: Primary | ICD-10-CM

## 2023-10-26 DIAGNOSIS — E78.5 HYPERLIPIDEMIA, UNSPECIFIED HYPERLIPIDEMIA TYPE: ICD-10-CM

## 2023-10-26 DIAGNOSIS — Z12.5 PROSTATE CANCER SCREENING: ICD-10-CM

## 2023-10-26 DIAGNOSIS — Z00.00 ANNUAL PHYSICAL EXAM: ICD-10-CM

## 2023-10-26 DIAGNOSIS — E53.8 COBALAMIN DEFICIENCY: ICD-10-CM

## 2023-10-26 NOTE — TELEPHONE ENCOUNTER
Are there any outstanding tasks in patient's chart?    labs  2. Do we have outstanding/pending referrals?  n    3. Has the patient been seen in an ER, Urgent Care, or admitted since last visit?  n    4. Has patient seen any other health care providers since last visit?  n    5.  Has patient had any blood work or x-rays done since last visit?    Will complete labs at [facility]

## 2023-10-30 ENCOUNTER — LAB VISIT (OUTPATIENT)
Dept: LAB | Facility: HOSPITAL | Age: 48
End: 2023-10-30
Attending: NURSE PRACTITIONER
Payer: COMMERCIAL

## 2023-10-30 DIAGNOSIS — Z12.5 PROSTATE CANCER SCREENING: ICD-10-CM

## 2023-10-30 DIAGNOSIS — E53.8 COBALAMIN DEFICIENCY: ICD-10-CM

## 2023-10-30 DIAGNOSIS — Z00.00 ANNUAL PHYSICAL EXAM: ICD-10-CM

## 2023-10-30 DIAGNOSIS — E55.9 VITAMIN D DEFICIENCY: ICD-10-CM

## 2023-10-30 LAB
ALBUMIN SERPL-MCNC: 4.2 G/DL (ref 3.5–5)
ALBUMIN/GLOB SERPL: 1.1 RATIO (ref 1.1–2)
ALP SERPL-CCNC: 90 UNIT/L (ref 40–150)
ALT SERPL-CCNC: 30 UNIT/L (ref 0–55)
AST SERPL-CCNC: 21 UNIT/L (ref 5–34)
BASOPHILS # BLD AUTO: 0.03 X10(3)/MCL
BASOPHILS NFR BLD AUTO: 0.8 %
BILIRUB SERPL-MCNC: 0.6 MG/DL
BUN SERPL-MCNC: 12.2 MG/DL (ref 8.9–20.6)
CALCIUM SERPL-MCNC: 9.8 MG/DL (ref 8.4–10.2)
CHLORIDE SERPL-SCNC: 104 MMOL/L (ref 98–107)
CHOLEST SERPL-MCNC: 277 MG/DL
CHOLEST/HDLC SERPL: 4 {RATIO} (ref 0–5)
CO2 SERPL-SCNC: 28 MMOL/L (ref 22–29)
CREAT SERPL-MCNC: 1.13 MG/DL (ref 0.73–1.18)
DEPRECATED CALCIDIOL+CALCIFEROL SERPL-MC: 44.5 NG/ML (ref 30–80)
EOSINOPHIL # BLD AUTO: 0.09 X10(3)/MCL (ref 0–0.9)
EOSINOPHIL NFR BLD AUTO: 2.3 %
ERYTHROCYTE [DISTWIDTH] IN BLOOD BY AUTOMATED COUNT: 13.2 % (ref 11.5–17)
EST. AVERAGE GLUCOSE BLD GHB EST-MCNC: 108.3 MG/DL
GFR SERPLBLD CREATININE-BSD FMLA CKD-EPI: >60 MLS/MIN/1.73/M2
GLOBULIN SER-MCNC: 3.7 GM/DL (ref 2.4–3.5)
GLUCOSE SERPL-MCNC: 99 MG/DL (ref 74–100)
HBA1C MFR BLD: 5.4 %
HCT VFR BLD AUTO: 49.9 % (ref 42–52)
HDLC SERPL-MCNC: 70 MG/DL (ref 35–60)
HGB BLD-MCNC: 15.6 G/DL (ref 14–18)
IMM GRANULOCYTES # BLD AUTO: 0.01 X10(3)/MCL (ref 0–0.04)
IMM GRANULOCYTES NFR BLD AUTO: 0.3 %
LDLC SERPL CALC-MCNC: 185 MG/DL (ref 50–140)
LYMPHOCYTES # BLD AUTO: 1.5 X10(3)/MCL (ref 0.6–4.6)
LYMPHOCYTES NFR BLD AUTO: 38.1 %
MCH RBC QN AUTO: 30.1 PG (ref 27–31)
MCHC RBC AUTO-ENTMCNC: 31.3 G/DL (ref 33–36)
MCV RBC AUTO: 96.3 FL (ref 80–94)
MONOCYTES # BLD AUTO: 0.41 X10(3)/MCL (ref 0.1–1.3)
MONOCYTES NFR BLD AUTO: 10.4 %
NEUTROPHILS # BLD AUTO: 1.9 X10(3)/MCL (ref 2.1–9.2)
NEUTROPHILS NFR BLD AUTO: 48.1 %
PLATELET # BLD AUTO: 248 X10(3)/MCL (ref 130–400)
PMV BLD AUTO: 9.1 FL (ref 7.4–10.4)
POTASSIUM SERPL-SCNC: 5 MMOL/L (ref 3.5–5.1)
PROT SERPL-MCNC: 7.9 GM/DL (ref 6.4–8.3)
PSA SERPL-MCNC: 0.79 NG/ML
RBC # BLD AUTO: 5.18 X10(6)/MCL (ref 4.7–6.1)
SODIUM SERPL-SCNC: 140 MMOL/L (ref 136–145)
TRIGL SERPL-MCNC: 112 MG/DL (ref 34–140)
TSH SERPL-ACNC: 1.06 UIU/ML (ref 0.35–4.94)
VIT B12 SERPL-MCNC: 432 PG/ML (ref 213–816)
VLDLC SERPL CALC-MCNC: 22 MG/DL
WBC # SPEC AUTO: 3.94 X10(3)/MCL (ref 4.5–11.5)

## 2023-10-30 PROCEDURE — 82306 VITAMIN D 25 HYDROXY: CPT

## 2023-10-30 PROCEDURE — 85025 COMPLETE CBC W/AUTO DIFF WBC: CPT

## 2023-10-30 PROCEDURE — 82607 VITAMIN B-12: CPT

## 2023-10-30 PROCEDURE — 80061 LIPID PANEL: CPT

## 2023-10-30 PROCEDURE — 36415 COLL VENOUS BLD VENIPUNCTURE: CPT

## 2023-10-30 PROCEDURE — 84153 ASSAY OF PSA TOTAL: CPT

## 2023-10-30 PROCEDURE — 84443 ASSAY THYROID STIM HORMONE: CPT

## 2023-10-30 PROCEDURE — 80053 COMPREHEN METABOLIC PANEL: CPT

## 2023-10-30 PROCEDURE — 83036 HEMOGLOBIN GLYCOSYLATED A1C: CPT

## 2023-11-02 ENCOUNTER — OFFICE VISIT (OUTPATIENT)
Dept: FAMILY MEDICINE | Facility: CLINIC | Age: 48
End: 2023-11-02
Payer: COMMERCIAL

## 2023-11-02 VITALS
WEIGHT: 213.38 LBS | TEMPERATURE: 98 F | BODY MASS INDEX: 27.39 KG/M2 | SYSTOLIC BLOOD PRESSURE: 130 MMHG | HEART RATE: 58 BPM | RESPIRATION RATE: 16 BRPM | DIASTOLIC BLOOD PRESSURE: 82 MMHG | OXYGEN SATURATION: 98 % | HEIGHT: 74 IN

## 2023-11-02 DIAGNOSIS — Z13.6 ENCOUNTER FOR SCREENING FOR CARDIOVASCULAR DISORDERS: ICD-10-CM

## 2023-11-02 DIAGNOSIS — F41.1 GENERALIZED ANXIETY DISORDER: ICD-10-CM

## 2023-11-02 DIAGNOSIS — Z00.00 ANNUAL PHYSICAL EXAM: Primary | ICD-10-CM

## 2023-11-02 DIAGNOSIS — L29.0 ANAL PRURITUS: ICD-10-CM

## 2023-11-02 DIAGNOSIS — E53.8 COBALAMIN DEFICIENCY: ICD-10-CM

## 2023-11-02 DIAGNOSIS — E55.9 VITAMIN D DEFICIENCY: ICD-10-CM

## 2023-11-02 DIAGNOSIS — Z12.11 COLON CANCER SCREENING: ICD-10-CM

## 2023-11-02 DIAGNOSIS — E78.2 MIXED HYPERLIPIDEMIA: ICD-10-CM

## 2023-11-02 DIAGNOSIS — Z12.5 PROSTATE CANCER SCREENING: ICD-10-CM

## 2023-11-02 DIAGNOSIS — R19.8 ABDOMINAL FULLNESS: ICD-10-CM

## 2023-11-02 PROCEDURE — 99396 PR PREVENTIVE VISIT,EST,40-64: ICD-10-PCS | Mod: ,,, | Performed by: NURSE PRACTITIONER

## 2023-11-02 PROCEDURE — 3008F BODY MASS INDEX DOCD: CPT | Mod: CPTII,,, | Performed by: NURSE PRACTITIONER

## 2023-11-02 PROCEDURE — 3008F PR BODY MASS INDEX (BMI) DOCUMENTED: ICD-10-PCS | Mod: CPTII,,, | Performed by: NURSE PRACTITIONER

## 2023-11-02 PROCEDURE — 3075F PR MOST RECENT SYSTOLIC BLOOD PRESS GE 130-139MM HG: ICD-10-PCS | Mod: CPTII,,, | Performed by: NURSE PRACTITIONER

## 2023-11-02 PROCEDURE — 3044F PR MOST RECENT HEMOGLOBIN A1C LEVEL <7.0%: ICD-10-PCS | Mod: CPTII,,, | Performed by: NURSE PRACTITIONER

## 2023-11-02 PROCEDURE — 1159F PR MEDICATION LIST DOCUMENTED IN MEDICAL RECORD: ICD-10-PCS | Mod: CPTII,,, | Performed by: NURSE PRACTITIONER

## 2023-11-02 PROCEDURE — 3075F SYST BP GE 130 - 139MM HG: CPT | Mod: CPTII,,, | Performed by: NURSE PRACTITIONER

## 2023-11-02 PROCEDURE — 3079F DIAST BP 80-89 MM HG: CPT | Mod: CPTII,,, | Performed by: NURSE PRACTITIONER

## 2023-11-02 PROCEDURE — 1159F MED LIST DOCD IN RCRD: CPT | Mod: CPTII,,, | Performed by: NURSE PRACTITIONER

## 2023-11-02 PROCEDURE — 3079F PR MOST RECENT DIASTOLIC BLOOD PRESSURE 80-89 MM HG: ICD-10-PCS | Mod: CPTII,,, | Performed by: NURSE PRACTITIONER

## 2023-11-02 PROCEDURE — 99396 PREV VISIT EST AGE 40-64: CPT | Mod: ,,, | Performed by: NURSE PRACTITIONER

## 2023-11-02 PROCEDURE — 3044F HG A1C LEVEL LT 7.0%: CPT | Mod: CPTII,,, | Performed by: NURSE PRACTITIONER

## 2023-11-02 PROCEDURE — 1160F PR REVIEW ALL MEDS BY PRESCRIBER/CLIN PHARMACIST DOCUMENTED: ICD-10-PCS | Mod: CPTII,,, | Performed by: NURSE PRACTITIONER

## 2023-11-02 PROCEDURE — 1160F RVW MEDS BY RX/DR IN RCRD: CPT | Mod: CPTII,,, | Performed by: NURSE PRACTITIONER

## 2023-11-02 RX ORDER — ALPRAZOLAM 0.25 MG/1
0.25 TABLET ORAL 3 TIMES DAILY PRN
Qty: 90 TABLET | Refills: 0 | Status: SHIPPED | OUTPATIENT
Start: 2023-11-02 | End: 2024-02-12

## 2023-11-02 NOTE — ASSESSMENT & PLAN NOTE
Discussed colon cancer screening options with patient including colonoscopy and Cologuard.  Patient would like to complete a colonoscopy, referral sent to [facility].

## 2023-11-02 NOTE — ASSESSMENT & PLAN NOTE
Stable, rarely has to take the Xanax.  Takes it about once per month.  Refill Xanax today, follow-up 1 year.

## 2023-11-02 NOTE — PROGRESS NOTES
Subjective:       Patient ID: Iron Yusuf Jr. is a 47 y.o. male.    Chief Complaint: Annual Exam      HPI   This is a 47-year-old white male who presents to clinic today for an annual wellness exam.  Patient reports that overall he is doing well.  Reports he stopped his cholesterol medicine about a month ago because he was having anal itching after getting out of the shower and figured maybe it was because of his cholesterol medicine.  Anal itching has not gotten any better since stopping the cholesterol medicine.  Review of Systems  Comprehensive review of systems negative except as stated in HPI    The patient's Health Maintenance was reviewed and the following appears to be due:   Health Maintenance Due   Topic Date Due    Colorectal Cancer Screening  Never done       Past Medical History:  Past Medical History:   Diagnosis Date    Anxiety disorder, unspecified     Cobalamin deficiency     Hyperlipidemia     Indigestion     Vitamin D deficiency      History reviewed. No pertinent surgical history.  Review of patient's allergies indicates:  No Known Allergies  Current Outpatient Medications on File Prior to Visit   Medication Sig Dispense Refill    multivitamin (THERAGRAN) per tablet Take 1 tablet by mouth once daily.      red yeast rice 600 mg Cap Take by mouth.      rosuvastatin (CRESTOR) 20 MG tablet TAKE 1 TABLET BY MOUTH EVERYDAY AT BEDTIME 90 tablet 1    [DISCONTINUED] ALPRAZolam (XANAX) 0.25 MG tablet Take 0.25 mg by mouth 3 (three) times daily as needed.       No current facility-administered medications on file prior to visit.     Social History     Socioeconomic History    Marital status:    Tobacco Use    Smoking status: Never     Passive exposure: Never    Smokeless tobacco: Never   Substance and Sexual Activity    Alcohol use: Yes     Alcohol/week: 10.0 standard drinks of alcohol     Types: 10 Drinks containing 0.5 oz of alcohol per week     Comment: couple drinks daily    Drug use: Never     "Sexual activity: Yes     Birth control/protection: None     Family History   Problem Relation Age of Onset    Rheum arthritis Mother        Objective:       /82 (BP Location: Left arm)   Pulse (!) 58   Temp 98 °F (36.7 °C) (Temporal)   Resp 16   Ht 6' 2" (1.88 m)   Wt 96.8 kg (213 lb 6.4 oz)   SpO2 98%   BMI 27.40 kg/m²      Physical Exam  Vitals and nursing note reviewed.   Constitutional:       Appearance: Normal appearance. He is normal weight.   HENT:      Head: Normocephalic and atraumatic.      Right Ear: Tympanic membrane, ear canal and external ear normal.      Left Ear: Tympanic membrane, ear canal and external ear normal.      Nose: Nose normal.      Mouth/Throat:      Mouth: Mucous membranes are moist.      Pharynx: Oropharynx is clear.   Eyes:      Extraocular Movements: Extraocular movements intact.      Conjunctiva/sclera: Conjunctivae normal.      Pupils: Pupils are equal, round, and reactive to light.   Cardiovascular:      Rate and Rhythm: Normal rate and regular rhythm.      Pulses: Normal pulses.      Heart sounds: Normal heart sounds.   Pulmonary:      Effort: Pulmonary effort is normal.      Breath sounds: Normal breath sounds.   Abdominal:      General: Abdomen is flat. Bowel sounds are normal.      Palpations: Abdomen is soft.   Musculoskeletal:         General: Normal range of motion.      Cervical back: Normal range of motion and neck supple.   Skin:     General: Skin is warm and dry.   Neurological:      General: No focal deficit present.      Mental Status: He is alert and oriented to person, place, and time.   Psychiatric:         Mood and Affect: Mood normal.         Behavior: Behavior normal.         Thought Content: Thought content normal.         Judgment: Judgment normal.         Labs  Lab Visit on 10/30/2023   Component Date Value Ref Range Status    Sodium Level 10/30/2023 140  136 - 145 mmol/L Final    Potassium Level 10/30/2023 5.0  3.5 - 5.1 mmol/L Final    Chloride " 10/30/2023 104  98 - 107 mmol/L Final    Carbon Dioxide 10/30/2023 28  22 - 29 mmol/L Final    Glucose Level 10/30/2023 99  74 - 100 mg/dL Final    Blood Urea Nitrogen 10/30/2023 12.2  8.9 - 20.6 mg/dL Final    Creatinine 10/30/2023 1.13  0.73 - 1.18 mg/dL Final    Calcium Level Total 10/30/2023 9.8  8.4 - 10.2 mg/dL Final    Protein Total 10/30/2023 7.9  6.4 - 8.3 gm/dL Final    Albumin Level 10/30/2023 4.2  3.5 - 5.0 g/dL Final    Globulin 10/30/2023 3.7 (H)  2.4 - 3.5 gm/dL Final    Albumin/Globulin Ratio 10/30/2023 1.1  1.1 - 2.0 ratio Final    Bilirubin Total 10/30/2023 0.6  <=1.5 mg/dL Final    Alkaline Phosphatase 10/30/2023 90  40 - 150 unit/L Final    Alanine Aminotransferase 10/30/2023 30  0 - 55 unit/L Final    Aspartate Aminotransferase 10/30/2023 21  5 - 34 unit/L Final    eGFR 10/30/2023 >60  mls/min/1.73/m2 Final    Cholesterol Total 10/30/2023 277 (H)  <=200 mg/dL Final    HDL Cholesterol 10/30/2023 70 (H)  35 - 60 mg/dL Final    Triglyceride 10/30/2023 112  34 - 140 mg/dL Final    Cholesterol/HDL Ratio 10/30/2023 4  0 - 5 Final    Very Low Density Lipoprotein 10/30/2023 22   Final    LDL Cholesterol 10/30/2023 185.00 (H)  50.00 - 140.00 mg/dL Final    TSH 10/30/2023 1.063  0.350 - 4.940 uIU/mL Final    Prostate Specific Antigen 10/30/2023 0.79  <=4.00 ng/mL Final    Hemoglobin A1c 10/30/2023 5.4  <=7.0 % Final    Estimated Average Glucose 10/30/2023 108.3  mg/dL Final    Vit D 25 OH 10/30/2023 44.5  30.0 - 80.0 ng/mL Final    Vitamin B12 Level 10/30/2023 432  213 - 816 pg/mL Final    WBC 10/30/2023 3.94 (L)  4.50 - 11.50 x10(3)/mcL Final    RBC 10/30/2023 5.18  4.70 - 6.10 x10(6)/mcL Final    Hgb 10/30/2023 15.6  14.0 - 18.0 g/dL Final    Hct 10/30/2023 49.9  42.0 - 52.0 % Final    MCV 10/30/2023 96.3 (H)  80.0 - 94.0 fL Final    MCH 10/30/2023 30.1  27.0 - 31.0 pg Final    MCHC 10/30/2023 31.3 (L)  33.0 - 36.0 g/dL Final    RDW 10/30/2023 13.2  11.5 - 17.0 % Final    Platelet 10/30/2023 248  130 -  400 x10(3)/mcL Final    MPV 10/30/2023 9.1  7.4 - 10.4 fL Final    Neut % 10/30/2023 48.1  % Final    Lymph % 10/30/2023 38.1  % Final    Mono % 10/30/2023 10.4  % Final    Eos % 10/30/2023 2.3  % Final    Basophil % 10/30/2023 0.8  % Final    Lymph # 10/30/2023 1.50  0.6 - 4.6 x10(3)/mcL Final    Neut # 10/30/2023 1.90 (L)  2.1 - 9.2 x10(3)/mcL Final    Mono # 10/30/2023 0.41  0.1 - 1.3 x10(3)/mcL Final    Eos # 10/30/2023 0.09  0 - 0.9 x10(3)/mcL Final    Baso # 10/30/2023 0.03  <=0.2 x10(3)/mcL Final    IG# 10/30/2023 0.01  0 - 0.04 x10(3)/mcL Final    IG% 10/30/2023 0.3  % Final       Assessment and Plan       ICD-10-CM ICD-9-CM   1. Annual physical exam  Z00.00 V70.0   2. Colon cancer screening  Z12.11 V76.51   3. Vitamin D deficiency  E55.9 268.9   4. Cobalamin deficiency  E53.8 266.2   5. Prostate cancer screening  Z12.5 V76.44   6. Mixed hyperlipidemia  E78.2 272.2   7. Generalized anxiety disorder  F41.1 300.02   8. Encounter for screening for cardiovascular disorders  Z13.6 V81.2   9. Abdominal fullness  R19.8 789.9   10. Anal pruritus  L29.0 698.0        1. Annual physical exam  Overview:  Annual exam yearly in November    Orders:  -     CBC Auto Differential; Future; Expected date: 11/02/2024  -     Comprehensive Metabolic Panel; Future; Expected date: 11/02/2024  -     Lipid Panel; Future; Expected date: 11/02/2024  -     TSH; Future; Expected date: 11/02/2024  -     Hemoglobin A1C; Future; Expected date: 11/02/2024    2. Colon cancer screening  Assessment & Plan:  Discussed colon cancer screening options with patient including colonoscopy and Cologuard.  Patient would like to complete a colonoscopy, referral sent to [facility].       Orders:  -     Ambulatory referral/consult to Gastroenterology; Future; Expected date: 11/09/2023    3. Vitamin D deficiency  Assessment & Plan:  Stable, vitamin-D 44.5, repeat 1 year.    Orders:  -     Vitamin D; Future; Expected date: 11/02/2024    4. Cobalamin  deficiency  Assessment & Plan:  Stable, B12 432, repeat 1 year.    Orders:  -     Vitamin B12; Future; Expected date: 11/02/2024    5. Prostate cancer screening  Overview:  PSA yearly in November    Assessment & Plan:  PSA 0.79, repeat 1 year.    Orders:  -     PSA, Screening; Future; Expected date: 11/02/2024    6. Mixed hyperlipidemia  Overview:  April 2021 - start rosuvastatin 20 mg daily, normal LFTs  October 25, 2021 - AST 40, ALT 54, total cholesterol 187, HDL 79, triglycerides 164, LDL 75  10/28/2022 - ALT 68, AST 48, total cholesterol 186, LDL 88    Assessment & Plan:  Patient stopped his rosuvastatin about a month ago because he was having anal itching.  Anal itching did not go away.  Total cholesterol 277, HDL 70, triglycerides 112, .  Instructed to restart rosuvastatin 20 mg daily.  Will recheck lipid panel in 1 year.  Coronary artery calcium screening CT ordered to be completed in 1 year as well.      7. Generalized anxiety disorder  Overview:  Xanax 0.25 mg 3 times daily as needed    Assessment & Plan:  Stable, rarely has to take the Xanax.  Takes it about once per month.  Refill Xanax today, follow-up 1 year.    Orders:  -     ALPRAZolam (XANAX) 0.25 MG tablet; Take 1 tablet (0.25 mg total) by mouth 3 (three) times daily as needed for Anxiety.  Dispense: 90 tablet; Refill: 0    8. Encounter for screening for cardiovascular disorders  -     CT Calcium Scoring Cardiac; Future; Expected date: 11/02/2024    9. Abdominal fullness  Assessment & Plan:  Encouraged to start monitoring which foods are making him feel a fullness in his abdomen.  Refer to GI for screening colonoscopy and evaluation.      10. Anal pruritus  Assessment & Plan:  Instructed to clean the area very well and keep dry.  Follow-up with GI.             Follow up in about 1 year (around 11/2/2024) for Annual.

## 2023-11-02 NOTE — ASSESSMENT & PLAN NOTE
Patient stopped his rosuvastatin about a month ago because he was having anal itching.  Anal itching did not go away.  Total cholesterol 277, HDL 70, triglycerides 112, .  Instructed to restart rosuvastatin 20 mg daily.  Will recheck lipid panel in 1 year.  Coronary artery calcium screening CT ordered to be completed in 1 year as well.

## 2023-11-02 NOTE — ASSESSMENT & PLAN NOTE
Encouraged to start monitoring which foods are making him feel a fullness in his abdomen.  Refer to GI for screening colonoscopy and evaluation.

## 2024-01-08 ENCOUNTER — DOCUMENTATION ONLY (OUTPATIENT)
Dept: FAMILY MEDICINE | Facility: CLINIC | Age: 49
End: 2024-01-08
Payer: COMMERCIAL

## 2024-01-08 LAB — CRC RECOMMENDATION EXT: NORMAL

## 2024-02-05 PROBLEM — Z00.00 ANNUAL PHYSICAL EXAM: Status: RESOLVED | Noted: 2022-11-01 | Resolved: 2024-02-05

## 2024-02-09 DIAGNOSIS — F41.1 GENERALIZED ANXIETY DISORDER: ICD-10-CM

## 2024-02-12 RX ORDER — ALPRAZOLAM 0.25 MG/1
0.25 TABLET ORAL 3 TIMES DAILY
Qty: 90 TABLET | Refills: 0 | Status: SHIPPED | OUTPATIENT
Start: 2024-02-12

## 2024-06-27 DIAGNOSIS — E78.5 HYPERLIPIDEMIA, UNSPECIFIED HYPERLIPIDEMIA TYPE: ICD-10-CM

## 2024-06-27 RX ORDER — ROSUVASTATIN CALCIUM 20 MG/1
TABLET, COATED ORAL
Qty: 90 TABLET | Refills: 1 | Status: SHIPPED | OUTPATIENT
Start: 2024-06-27

## 2024-09-30 ENCOUNTER — TELEPHONE (OUTPATIENT)
Dept: FAMILY MEDICINE | Facility: CLINIC | Age: 49
End: 2024-09-30
Payer: COMMERCIAL

## 2024-10-29 ENCOUNTER — TELEPHONE (OUTPATIENT)
Dept: FAMILY MEDICINE | Facility: CLINIC | Age: 49
End: 2024-10-29
Payer: COMMERCIAL

## 2024-11-04 ENCOUNTER — LAB VISIT (OUTPATIENT)
Dept: LAB | Facility: HOSPITAL | Age: 49
End: 2024-11-04
Attending: NURSE PRACTITIONER
Payer: COMMERCIAL

## 2024-11-04 DIAGNOSIS — Z00.00 ANNUAL PHYSICAL EXAM: ICD-10-CM

## 2024-11-04 DIAGNOSIS — Z12.5 PROSTATE CANCER SCREENING: ICD-10-CM

## 2024-11-04 DIAGNOSIS — E53.8 COBALAMIN DEFICIENCY: ICD-10-CM

## 2024-11-04 DIAGNOSIS — E55.9 VITAMIN D DEFICIENCY: ICD-10-CM

## 2024-11-04 LAB
25(OH)D3+25(OH)D2 SERPL-MCNC: 40 NG/ML (ref 30–80)
ALBUMIN SERPL-MCNC: 3.9 G/DL (ref 3.5–5)
ALBUMIN/GLOB SERPL: 1.1 RATIO (ref 1.1–2)
ALP SERPL-CCNC: 74 UNIT/L (ref 40–150)
ALT SERPL-CCNC: 34 UNIT/L (ref 0–55)
ANION GAP SERPL CALC-SCNC: 7 MEQ/L
AST SERPL-CCNC: 25 UNIT/L (ref 5–34)
BASOPHILS # BLD AUTO: 0.02 X10(3)/MCL
BASOPHILS NFR BLD AUTO: 0.4 %
BILIRUB SERPL-MCNC: 0.5 MG/DL
BUN SERPL-MCNC: 14.4 MG/DL (ref 8.4–25.7)
CALCIUM SERPL-MCNC: 9.3 MG/DL (ref 8.4–10.2)
CHLORIDE SERPL-SCNC: 107 MMOL/L (ref 98–107)
CHOLEST SERPL-MCNC: 191 MG/DL
CHOLEST/HDLC SERPL: 3 {RATIO} (ref 0–5)
CO2 SERPL-SCNC: 26 MMOL/L (ref 22–29)
CREAT SERPL-MCNC: 1.18 MG/DL (ref 0.72–1.25)
CREAT/UREA NIT SERPL: 12
EOSINOPHIL # BLD AUTO: 0.09 X10(3)/MCL (ref 0–0.9)
EOSINOPHIL NFR BLD AUTO: 1.9 %
ERYTHROCYTE [DISTWIDTH] IN BLOOD BY AUTOMATED COUNT: 13 % (ref 11.5–17)
EST. AVERAGE GLUCOSE BLD GHB EST-MCNC: 111.2 MG/DL
GFR SERPLBLD CREATININE-BSD FMLA CKD-EPI: >60 ML/MIN/1.73/M2
GLOBULIN SER-MCNC: 3.5 GM/DL (ref 2.4–3.5)
GLUCOSE SERPL-MCNC: 113 MG/DL (ref 74–100)
HBA1C MFR BLD: 5.5 %
HCT VFR BLD AUTO: 46.9 % (ref 42–52)
HDLC SERPL-MCNC: 69 MG/DL (ref 35–60)
HGB BLD-MCNC: 15.5 G/DL (ref 14–18)
IMM GRANULOCYTES # BLD AUTO: 0.01 X10(3)/MCL (ref 0–0.04)
IMM GRANULOCYTES NFR BLD AUTO: 0.2 %
LDLC SERPL CALC-MCNC: 107 MG/DL (ref 50–140)
LYMPHOCYTES # BLD AUTO: 1.71 X10(3)/MCL (ref 0.6–4.6)
LYMPHOCYTES NFR BLD AUTO: 37 %
MCH RBC QN AUTO: 31.3 PG (ref 27–31)
MCHC RBC AUTO-ENTMCNC: 33 G/DL (ref 33–36)
MCV RBC AUTO: 94.6 FL (ref 80–94)
MONOCYTES # BLD AUTO: 0.44 X10(3)/MCL (ref 0.1–1.3)
MONOCYTES NFR BLD AUTO: 9.5 %
NEUTROPHILS # BLD AUTO: 2.35 X10(3)/MCL (ref 2.1–9.2)
NEUTROPHILS NFR BLD AUTO: 51 %
PLATELET # BLD AUTO: 217 X10(3)/MCL (ref 130–400)
PMV BLD AUTO: 9.3 FL (ref 7.4–10.4)
POTASSIUM SERPL-SCNC: 4.5 MMOL/L (ref 3.5–5.1)
PROT SERPL-MCNC: 7.4 GM/DL (ref 6.4–8.3)
PSA SERPL-MCNC: 0.61 NG/ML
RBC # BLD AUTO: 4.96 X10(6)/MCL (ref 4.7–6.1)
SODIUM SERPL-SCNC: 140 MMOL/L (ref 136–145)
TRIGL SERPL-MCNC: 75 MG/DL (ref 34–140)
TSH SERPL-ACNC: 1.69 UIU/ML (ref 0.35–4.94)
VIT B12 SERPL-MCNC: 406 PG/ML (ref 213–816)
VLDLC SERPL CALC-MCNC: 15 MG/DL
WBC # BLD AUTO: 4.62 X10(3)/MCL (ref 4.5–11.5)

## 2024-11-04 PROCEDURE — 84443 ASSAY THYROID STIM HORMONE: CPT

## 2024-11-04 PROCEDURE — 84153 ASSAY OF PSA TOTAL: CPT

## 2024-11-04 PROCEDURE — 82306 VITAMIN D 25 HYDROXY: CPT

## 2024-11-04 PROCEDURE — 83036 HEMOGLOBIN GLYCOSYLATED A1C: CPT

## 2024-11-04 PROCEDURE — 80053 COMPREHEN METABOLIC PANEL: CPT

## 2024-11-04 PROCEDURE — 82607 VITAMIN B-12: CPT

## 2024-11-04 PROCEDURE — 80061 LIPID PANEL: CPT

## 2024-11-04 PROCEDURE — 85025 COMPLETE CBC W/AUTO DIFF WBC: CPT

## 2024-11-04 PROCEDURE — 36415 COLL VENOUS BLD VENIPUNCTURE: CPT

## 2024-11-05 ENCOUNTER — OFFICE VISIT (OUTPATIENT)
Dept: FAMILY MEDICINE | Facility: CLINIC | Age: 49
End: 2024-11-05
Payer: COMMERCIAL

## 2024-11-05 VITALS
RESPIRATION RATE: 16 BRPM | OXYGEN SATURATION: 98 % | SYSTOLIC BLOOD PRESSURE: 126 MMHG | BODY MASS INDEX: 27.21 KG/M2 | WEIGHT: 212 LBS | HEART RATE: 59 BPM | DIASTOLIC BLOOD PRESSURE: 70 MMHG | HEIGHT: 74 IN | TEMPERATURE: 98 F

## 2024-11-05 DIAGNOSIS — R19.8 ABDOMINAL FULLNESS: ICD-10-CM

## 2024-11-05 DIAGNOSIS — K63.5 POLYP OF COLON, UNSPECIFIED PART OF COLON, UNSPECIFIED TYPE: ICD-10-CM

## 2024-11-05 DIAGNOSIS — Z12.5 PROSTATE CANCER SCREENING: ICD-10-CM

## 2024-11-05 DIAGNOSIS — Z00.00 ANNUAL PHYSICAL EXAM: Primary | ICD-10-CM

## 2024-11-05 DIAGNOSIS — R10.32 LEFT LOWER QUADRANT PAIN: ICD-10-CM

## 2024-11-05 DIAGNOSIS — R10.30 LOWER ABDOMINAL PAIN: ICD-10-CM

## 2024-11-05 DIAGNOSIS — E78.2 MIXED HYPERLIPIDEMIA: ICD-10-CM

## 2024-11-05 DIAGNOSIS — I20.89 ANGINAL EQUIVALENT: ICD-10-CM

## 2024-11-05 DIAGNOSIS — F41.1 GENERALIZED ANXIETY DISORDER: ICD-10-CM

## 2024-11-05 DIAGNOSIS — E55.9 VITAMIN D DEFICIENCY: ICD-10-CM

## 2024-11-05 DIAGNOSIS — E53.8 COBALAMIN DEFICIENCY: ICD-10-CM

## 2024-11-05 LAB
EKG 12-LEAD: NORMAL
PR INTERVAL: NORMAL
PRT AXES: NORMAL
QRS DURATION: NORMAL
QT/QTC: NORMAL
VENTRICULAR RATE: NORMAL

## 2024-11-05 PROCEDURE — 3078F DIAST BP <80 MM HG: CPT | Mod: CPTII,,, | Performed by: NURSE PRACTITIONER

## 2024-11-05 PROCEDURE — 3008F BODY MASS INDEX DOCD: CPT | Mod: CPTII,,, | Performed by: NURSE PRACTITIONER

## 2024-11-05 PROCEDURE — 3044F HG A1C LEVEL LT 7.0%: CPT | Mod: CPTII,,, | Performed by: NURSE PRACTITIONER

## 2024-11-05 PROCEDURE — 1159F MED LIST DOCD IN RCRD: CPT | Mod: CPTII,,, | Performed by: NURSE PRACTITIONER

## 2024-11-05 PROCEDURE — 1160F RVW MEDS BY RX/DR IN RCRD: CPT | Mod: CPTII,,, | Performed by: NURSE PRACTITIONER

## 2024-11-05 PROCEDURE — 3074F SYST BP LT 130 MM HG: CPT | Mod: CPTII,,, | Performed by: NURSE PRACTITIONER

## 2024-11-05 PROCEDURE — 99396 PREV VISIT EST AGE 40-64: CPT | Mod: ,,, | Performed by: NURSE PRACTITIONER

## 2024-11-05 PROCEDURE — 93000 ELECTROCARDIOGRAM COMPLETE: CPT | Mod: ,,, | Performed by: NURSE PRACTITIONER

## 2024-11-05 RX ORDER — ALPRAZOLAM 0.25 MG/1
0.25 TABLET ORAL 3 TIMES DAILY
Qty: 90 TABLET | Refills: 1 | Status: SHIPPED | OUTPATIENT
Start: 2024-11-05

## 2024-11-05 NOTE — ASSESSMENT & PLAN NOTE
Labs reviewed in detail with patient, will repeat in 1 year.  Would like to think about the shingles vaccine at this time.  Will discuss at visit next year.

## 2024-11-05 NOTE — ASSESSMENT & PLAN NOTE
Still having abdominal fullness, lower abdominal pain.  Colonoscopy did not show any abnormalities, GI recommend CT scan.  CT of the abdomen and pelvis ordered, will call with results.

## 2024-11-05 NOTE — ASSESSMENT & PLAN NOTE
EKG in clinic today sinus bradycardia, normal ST and T-waves.  Will refer to [facility] in [place] for exercise treadmill testing.

## 2024-11-05 NOTE — PROGRESS NOTES
Subjective:       Patient ID: Iron Yusuf Jr. is a 48 y.o. male.    Chief Complaint: Annual Exam      HPI   This has a 48-year-old white male who presents to clinic today for an annual wellness exam.  Patient reports overall doing well.  Does have a complaint of continued lower abdominal fullness and some pain/pressure.  Had colonoscopy and everything checked out okay.  Also reports that over the last few months, he has had some episodes that have concerned him when he was physically exerting himself.  For example, when he was camping and unloading the camper, he became diaphoretic, clammy, had to sit down.  Felt extremely fatigued.  Denies any chest pain or shortness a breath during these episodes but reports that it is happened about 3 or 4 times over the last 4-6 months.  Review of Systems  Comprehensive review of systems negative except as stated in HPI    The patient's Health Maintenance was reviewed and the following appears to be due:   There are no preventive care reminders to display for this patient.      Past Medical History:  Past Medical History:   Diagnosis Date    Anxiety disorder, unspecified     Cobalamin deficiency     Hyperlipidemia     Indigestion     Personal history of colonic polyps 01/08/2024    Vitamin D deficiency      Past Surgical History:   Procedure Laterality Date    COLONOSCOPY W/ BIOPSIES AND POLYPECTOMY  01/08/2024     Review of patient's allergies indicates:  No Known Allergies  Current Outpatient Medications on File Prior to Visit   Medication Sig Dispense Refill    multivitamin (THERAGRAN) per tablet Take 1 tablet by mouth once daily.      red yeast rice 600 mg Cap Take by mouth.      rosuvastatin (CRESTOR) 20 MG tablet TAKE 1 TABLET BY MOUTH EVERYDAY AT BEDTIME 90 tablet 1    [DISCONTINUED] ALPRAZolam (XANAX) 0.25 MG tablet TAKE 1 TABLET BY MOUTH 3 TIMES DAILY AS NEEDED FOR ANXIETY. 90 tablet 0     No current facility-administered medications on file prior to visit.     Social  "History     Socioeconomic History    Marital status:    Tobacco Use    Smoking status: Never     Passive exposure: Never    Smokeless tobacco: Never   Substance and Sexual Activity    Alcohol use: Yes     Alcohol/week: 10.0 standard drinks of alcohol     Types: 10 Drinks containing 0.5 oz of alcohol per week     Comment: couple drinks daily    Drug use: Never    Sexual activity: Yes     Birth control/protection: None     Family History   Problem Relation Name Age of Onset    Rheum arthritis Mother         Objective:       /70 (BP Location: Left arm)   Pulse (!) 59   Temp 98.1 °F (36.7 °C) (Oral)   Resp 16   Ht 6' 2" (1.88 m)   Wt 96.2 kg (212 lb)   SpO2 98%   BMI 27.22 kg/m²      Physical Exam  Vitals and nursing note reviewed.   Constitutional:       Appearance: Normal appearance. He is normal weight.   HENT:      Head: Normocephalic and atraumatic.      Right Ear: Tympanic membrane, ear canal and external ear normal.      Left Ear: Tympanic membrane, ear canal and external ear normal.      Nose: Nose normal.      Mouth/Throat:      Mouth: Mucous membranes are moist.      Pharynx: Oropharynx is clear.   Eyes:      Extraocular Movements: Extraocular movements intact.      Conjunctiva/sclera: Conjunctivae normal.      Pupils: Pupils are equal, round, and reactive to light.   Cardiovascular:      Rate and Rhythm: Normal rate and regular rhythm.      Pulses: Normal pulses.      Heart sounds: Normal heart sounds.   Pulmonary:      Effort: Pulmonary effort is normal.      Breath sounds: Normal breath sounds.   Abdominal:      General: Abdomen is flat. Bowel sounds are normal.      Palpations: Abdomen is soft.   Musculoskeletal:         General: Normal range of motion.      Cervical back: Normal range of motion and neck supple.   Skin:     General: Skin is warm and dry.   Neurological:      General: No focal deficit present.      Mental Status: He is alert and oriented to person, place, and time. "   Psychiatric:         Mood and Affect: Mood normal.         Behavior: Behavior normal.         Thought Content: Thought content normal.         Judgment: Judgment normal.         Labs  Office Visit on 11/05/2024   Component Date Value Ref Range Status    EKG 12-Lead 11/05/2024 Sinus bradycardia with short IA   Final    Ventricular Rate 11/05/2024 55 bpm   Final    IA Interval 11/05/2024 104 ms   Final    QRS Duration 11/05/2024 84 ms   Final    QT/QTc 11/05/2024 416/397 ms   Final    PRT Axes 11/05/2024 25/23/29 degrees   Final   Lab Visit on 11/04/2024   Component Date Value Ref Range Status    Sodium 11/04/2024 140  136 - 145 mmol/L Final    Potassium 11/04/2024 4.5  3.5 - 5.1 mmol/L Final    Chloride 11/04/2024 107  98 - 107 mmol/L Final    CO2 11/04/2024 26  22 - 29 mmol/L Final    Glucose 11/04/2024 113 (H)  74 - 100 mg/dL Final    Blood Urea Nitrogen 11/04/2024 14.4  8.4 - 25.7 mg/dL Final    Creatinine 11/04/2024 1.18  0.72 - 1.25 mg/dL Final    Calcium 11/04/2024 9.3  8.4 - 10.2 mg/dL Final    Protein Total 11/04/2024 7.4  6.4 - 8.3 gm/dL Final    Albumin 11/04/2024 3.9  3.5 - 5.0 g/dL Final    Globulin 11/04/2024 3.5  2.4 - 3.5 gm/dL Final    Albumin/Globulin Ratio 11/04/2024 1.1  1.1 - 2.0 ratio Final    Bilirubin Total 11/04/2024 0.5  <=1.5 mg/dL Final    ALP 11/04/2024 74  40 - 150 unit/L Final    ALT 11/04/2024 34  0 - 55 unit/L Final    AST 11/04/2024 25  5 - 34 unit/L Final    eGFR 11/04/2024 >60  mL/min/1.73/m2 Final    Anion Gap 11/04/2024 7.0  mEq/L Final    BUN/Creatinine Ratio 11/04/2024 12   Final    Cholesterol Total 11/04/2024 191  <=200 mg/dL Final    HDL Cholesterol 11/04/2024 69 (H)  35 - 60 mg/dL Final    Triglyceride 11/04/2024 75  34 - 140 mg/dL Final    Cholesterol/HDL Ratio 11/04/2024 3  0 - 5 Final    Very Low Density Lipoprotein 11/04/2024 15   Final    LDL Cholesterol 11/04/2024 107.00  50.00 - 140.00 mg/dL Final    TSH 11/04/2024 1.686  0.350 - 4.940 uIU/mL Final    Hemoglobin A1c  11/04/2024 5.5  <=7.0 % Final    Estimated Average Glucose 11/04/2024 111.2  mg/dL Final    Prostate Specific Antigen 11/04/2024 0.61  <=4.00 ng/mL Final    Vitamin D 11/04/2024 40  30 - 80 ng/mL Final    Vitamin B12 11/04/2024 406  213 - 816 pg/mL Final    WBC 11/04/2024 4.62  4.50 - 11.50 x10(3)/mcL Final    RBC 11/04/2024 4.96  4.70 - 6.10 x10(6)/mcL Final    Hgb 11/04/2024 15.5  14.0 - 18.0 g/dL Final    Hct 11/04/2024 46.9  42.0 - 52.0 % Final    MCV 11/04/2024 94.6 (H)  80.0 - 94.0 fL Final    MCH 11/04/2024 31.3 (H)  27.0 - 31.0 pg Final    MCHC 11/04/2024 33.0  33.0 - 36.0 g/dL Final    RDW 11/04/2024 13.0  11.5 - 17.0 % Final    Platelet 11/04/2024 217  130 - 400 x10(3)/mcL Final    MPV 11/04/2024 9.3  7.4 - 10.4 fL Final    Neut % 11/04/2024 51.0  % Final    Lymph % 11/04/2024 37.0  % Final    Mono % 11/04/2024 9.5  % Final    Eos % 11/04/2024 1.9  % Final    Basophil % 11/04/2024 0.4  % Final    Lymph # 11/04/2024 1.71  0.6 - 4.6 x10(3)/mcL Final    Neut # 11/04/2024 2.35  2.1 - 9.2 x10(3)/mcL Final    Mono # 11/04/2024 0.44  0.1 - 1.3 x10(3)/mcL Final    Eos # 11/04/2024 0.09  0 - 0.9 x10(3)/mcL Final    Baso # 11/04/2024 0.02  <=0.2 x10(3)/mcL Final    IG# 11/04/2024 0.01  0 - 0.04 x10(3)/mcL Final    IG% 11/04/2024 0.2  % Final       Assessment and Plan       ICD-10-CM ICD-9-CM   1. Annual physical exam  Z00.00 V70.0   2. Prostate cancer screening  Z12.5 V76.44   3. Generalized anxiety disorder  F41.1 300.02   4. Mixed hyperlipidemia  E78.2 272.2   5. Cobalamin deficiency  E53.8 266.2   6. Vitamin D deficiency  E55.9 268.9   7. Polyp of colon, unspecified part of colon, unspecified type  K63.5 211.3   8. Anginal equivalent  I20.89 413.9   9. Lower abdominal pain  R10.30 789.09   10. Left lower quadrant pain  R10.32 789.04   11. Abdominal fullness  R19.8 789.9        1. Annual physical exam  Overview:  Annual exam yearly in November    Assessment & Plan:  Labs reviewed in detail with patient, will  repeat in 1 year.  Would like to think about the shingles vaccine at this time.  Will discuss at visit next year.      2. Prostate cancer screening  Overview:  PSA yearly in November    Assessment & Plan:  PSA 0.61, repeat 1 year.      3. Generalized anxiety disorder  Overview:  Xanax 0.25 mg 3 times daily as needed    Assessment & Plan:  Stable, rarely has to take the Xanax.  Takes it about once per month.  Refill Xanax today, follow-up 1 year.    Orders:  -     ALPRAZolam (XANAX) 0.25 MG tablet; Take 1 tablet (0.25 mg total) by mouth 3 (three) times daily.  Dispense: 90 tablet; Refill: 1    4. Mixed hyperlipidemia  Overview:  April 2021 - start rosuvastatin 20 mg daily, normal LFTs  October 25, 2021 - AST 40, ALT 54, total cholesterol 187, HDL 79, triglycerides 164, LDL 75  10/28/2022 - ALT 68, AST 48, total cholesterol 186, LDL 88    Assessment & Plan:  Stable, total cholesterol 191, .  Continue rosuvastatin 20 mg daily.  Encouraged low-cholesterol diet.  Follow-up with [facility] as well.      5. Cobalamin deficiency  Assessment & Plan:  Stable, B12 406, recheck 1 year.      6. Vitamin D deficiency  Assessment & Plan:  Stable, vitamin-D 40, recheck 1 year.      7. Polyp of colon, unspecified part of colon, unspecified type  Overview:  Dr Jann Fraser  Colonoscopy 01/08/2024 - 1 polyp, recommend repeat 7 years    Assessment & Plan:  Colonoscopy reviewed, repeat in 7 years as recommended.      8. Anginal equivalent  Assessment & Plan:  EKG in clinic today sinus bradycardia, normal ST and T-waves.  Will refer to [facility] in [place] for exercise treadmill testing.    Orders:  -     POCT EKG 12-LEAD (Manually Resulted by Ordering Provider)  -     Ambulatory referral/consult to Cardiology; Future; Expected date: 11/12/2024    9. Lower abdominal pain  Assessment & Plan:  CT abdomen pelvis ordered, will call with results.      10. Left lower quadrant pain  -     CT Abdomen Pelvis W Wo Contrast; Future; Expected date:  11/05/2024    11. Abdominal fullness  Assessment & Plan:  Still having abdominal fullness, lower abdominal pain.  Colonoscopy did not show any abnormalities, GI recommend CT scan.  CT of the abdomen and pelvis ordered, will call with results.             Follow up in about 1 year (around 11/5/2025) for Annual.

## 2024-11-05 NOTE — ASSESSMENT & PLAN NOTE
Stable, total cholesterol 191, .  Continue rosuvastatin 20 mg daily.  Encouraged low-cholesterol diet.  Follow-up with [facility] as well.

## 2024-12-24 DIAGNOSIS — E78.5 HYPERLIPIDEMIA, UNSPECIFIED HYPERLIPIDEMIA TYPE: ICD-10-CM

## 2024-12-26 RX ORDER — ROSUVASTATIN CALCIUM 20 MG/1
TABLET, COATED ORAL
Qty: 90 TABLET | Refills: 1 | Status: SHIPPED | OUTPATIENT
Start: 2024-12-26

## 2025-06-19 DIAGNOSIS — E78.5 HYPERLIPIDEMIA, UNSPECIFIED HYPERLIPIDEMIA TYPE: ICD-10-CM

## 2025-06-19 RX ORDER — ROSUVASTATIN CALCIUM 20 MG/1
20 TABLET, COATED ORAL
Qty: 90 TABLET | Refills: 1 | Status: SHIPPED | OUTPATIENT
Start: 2025-06-19